# Patient Record
Sex: FEMALE | Race: BLACK OR AFRICAN AMERICAN | NOT HISPANIC OR LATINO | URBAN - METROPOLITAN AREA
[De-identification: names, ages, dates, MRNs, and addresses within clinical notes are randomized per-mention and may not be internally consistent; named-entity substitution may affect disease eponyms.]

---

## 2020-08-18 ENCOUNTER — TELEPHONE ENCOUNTER (OUTPATIENT)
Dept: URBAN - METROPOLITAN AREA CLINIC 96 | Facility: CLINIC | Age: 70
End: 2020-08-18

## 2020-08-27 ENCOUNTER — TELEPHONE ENCOUNTER (OUTPATIENT)
Dept: URBAN - METROPOLITAN AREA CLINIC 96 | Facility: CLINIC | Age: 70
End: 2020-08-27

## 2020-09-14 ENCOUNTER — LAB OUTSIDE AN ENCOUNTER (OUTPATIENT)
Dept: URBAN - METROPOLITAN AREA CLINIC 96 | Facility: CLINIC | Age: 70
End: 2020-09-14

## 2020-09-14 ENCOUNTER — OFFICE VISIT (OUTPATIENT)
Dept: URBAN - METROPOLITAN AREA MEDICAL CENTER 28 | Facility: MEDICAL CENTER | Age: 70
End: 2020-09-14
Payer: MEDICARE

## 2020-09-14 DIAGNOSIS — K22.8 OTHER SPECIFIED DISEASES OF ESOPHAGUS: ICD-10-CM

## 2020-09-14 DIAGNOSIS — Z12.11 AVERAGE RISK FOR CRC. DUE TO PT'S CO-MORBID STATE WITH END STAGE DEMENTIA, HIGH RISK FOR ANESTHESIA (PER NEUROLOGY); INABILITY TO TAKE A BOWEL PREP....WOULD NOT ADVISE ANY COLORECTAL CANCER SCREENING INCLUDING STOOL TEST FOR FECAL BLOOD.: ICD-10-CM

## 2020-09-14 DIAGNOSIS — K21.9 ACID REFLUX: ICD-10-CM

## 2020-09-14 DIAGNOSIS — D12.2 ADENOMA OF ASCENDING COLON: ICD-10-CM

## 2020-09-14 DIAGNOSIS — K29.60 ADENOPAPILLOMATOSIS GASTRICA: ICD-10-CM

## 2020-09-14 DIAGNOSIS — K31.89 ACQUIRED DEFORMITY OF DUODENUM: ICD-10-CM

## 2020-09-14 LAB
GLUCOSE POC: 98
PERFORMING LAB: (no result)

## 2020-09-14 PROCEDURE — 43239 EGD BIOPSY SINGLE/MULTIPLE: CPT | Performed by: INTERNAL MEDICINE

## 2020-09-14 PROCEDURE — 45380 COLONOSCOPY AND BIOPSY: CPT | Performed by: INTERNAL MEDICINE

## 2020-10-05 ENCOUNTER — WEB ENCOUNTER (OUTPATIENT)
Dept: URBAN - METROPOLITAN AREA CLINIC 96 | Facility: CLINIC | Age: 70
End: 2020-10-05

## 2020-10-05 ENCOUNTER — OFFICE VISIT (OUTPATIENT)
Dept: URBAN - METROPOLITAN AREA CLINIC 96 | Facility: CLINIC | Age: 70
End: 2020-10-05
Payer: MEDICARE

## 2020-10-05 ENCOUNTER — DASHBOARD ENCOUNTERS (OUTPATIENT)
Age: 70
End: 2020-10-05

## 2020-10-05 DIAGNOSIS — D12.2 ADENOMATOUS POLYP OF ASCENDING COLON: ICD-10-CM

## 2020-10-05 DIAGNOSIS — K29.50 CHRONIC GASTRITIS WITHOUT BLEEDING, UNSPECIFIED GASTRITIS TYPE: ICD-10-CM

## 2020-10-05 PROBLEM — 8493009: Status: ACTIVE | Noted: 2020-10-05

## 2020-10-05 PROCEDURE — 3017F COLORECTAL CA SCREEN DOC REV: CPT | Performed by: PHYSICIAN ASSISTANT

## 2020-10-05 PROCEDURE — G8484 FLU IMMUNIZE NO ADMIN: HCPCS | Performed by: PHYSICIAN ASSISTANT

## 2020-10-05 PROCEDURE — 99213 OFFICE O/P EST LOW 20 MIN: CPT | Performed by: PHYSICIAN ASSISTANT

## 2020-10-05 PROCEDURE — G8427 DOCREV CUR MEDS BY ELIG CLIN: HCPCS | Performed by: PHYSICIAN ASSISTANT

## 2020-10-05 PROCEDURE — G8417 CALC BMI ABV UP PARAM F/U: HCPCS | Performed by: PHYSICIAN ASSISTANT

## 2020-10-05 PROCEDURE — G9903 PT SCRN TBCO ID AS NON USER: HCPCS | Performed by: PHYSICIAN ASSISTANT

## 2020-10-05 RX ORDER — ASPIRIN 81 MG/1
TABLET, CHEWABLE ORAL
Qty: 0 | Refills: 0 | Status: ACTIVE | COMMUNITY
Start: 1900-01-01

## 2020-10-05 RX ORDER — FEBUXOSTAT 40 MG/1
TAKE 1 TABLET (80 MG) BY ORAL ROUTE ONCE DAILY TABLET ORAL 1
Refills: 0 | Status: ACTIVE | COMMUNITY
Start: 1900-01-01

## 2020-10-05 RX ORDER — ATORVASTATIN CALCIUM 40 MG/1
TAKE 1 TABLET (40 MG) BY ORAL ROUTE ONCE DAILY TABLET, FILM COATED ORAL 1
Qty: 0 | Refills: 0 | Status: ACTIVE | COMMUNITY
Start: 1900-01-01

## 2020-10-05 NOTE — HPI-TODAY'S VISIT:
Patient is here for follow-up from her upper endoscopy and colonoscopy that was performed on September 14, 2020. Her colonoscopy she had internal hemorrhoids, diverticulosis in the sigmoid and descending colon, 2 colon polyps one and the ascending colon and one of the sigmoid colon and a redundant colon. The pathology of the polyps consisted of a tubular adenoma in the ascending colon and a hyperplastic polyp in the sigmoid colon. It was recommended to have a repeat colonoscopy in three years. Her upper endoscopy showed grade a reflux esophagitis, antral gastritis with a few small erosion's. Pathology consistent with gastritis. She  does not take any medicaitons for indigestion No abd pain No nausea or emesis No fevers or chills She has 2-3 Bm qd - no BRBPR or melena

## 2020-10-05 NOTE — PHYSICAL EXAM CONSTITUTIONAL:
in no acute distress,  over weight, well nourished,  ambulating without difficulty,  normal communication ability

## 2025-06-18 ENCOUNTER — LAB OUTSIDE AN ENCOUNTER (OUTPATIENT)
Dept: URBAN - METROPOLITAN AREA CLINIC 96 | Facility: CLINIC | Age: 75
End: 2025-06-18

## 2025-06-18 ENCOUNTER — OFFICE VISIT (OUTPATIENT)
Dept: URBAN - METROPOLITAN AREA CLINIC 96 | Facility: CLINIC | Age: 75
End: 2025-06-18
Payer: COMMERCIAL

## 2025-06-18 DIAGNOSIS — Z86.0101 PERSONAL HISTORY OF ADENOMATOUS AND SERRATED COLON POLYPS: ICD-10-CM

## 2025-06-18 DIAGNOSIS — N18.31 STAGE 3A CHRONIC KIDNEY DISEASE (CKD): ICD-10-CM

## 2025-06-18 DIAGNOSIS — R03.0 ELEVATED BLOOD PRESSURE READING: ICD-10-CM

## 2025-06-18 PROBLEM — 700378005: Status: ACTIVE | Noted: 2025-06-18

## 2025-06-18 PROBLEM — 24184005: Status: ACTIVE | Noted: 2025-06-18

## 2025-06-18 PROCEDURE — 99204 OFFICE O/P NEW MOD 45 MIN: CPT

## 2025-06-18 RX ORDER — ATORVASTATIN CALCIUM 40 MG/1
TAKE 1 TABLET (40 MG) BY ORAL ROUTE ONCE DAILY TABLET, FILM COATED ORAL 1
Qty: 0 | Refills: 0 | Status: ACTIVE | COMMUNITY
Start: 1900-01-01

## 2025-06-18 RX ORDER — FEBUXOSTAT 40 MG/1
TAKE 1 TABLET (80 MG) BY ORAL ROUTE ONCE DAILY TABLET ORAL 1
Refills: 0 | Status: ACTIVE | COMMUNITY
Start: 1900-01-01

## 2025-06-18 RX ORDER — ASPIRIN 81 MG/1
TABLET, CHEWABLE ORAL
Qty: 0 | Refills: 0 | Status: ACTIVE | COMMUNITY
Start: 1900-01-01

## 2025-06-18 RX ORDER — SODIUM, POTASSIUM,MAG SULFATES 17.5-3.13G
AS DIRECTED SOLUTION, RECONSTITUTED, ORAL ORAL
Qty: 1 | Refills: 0 | OUTPATIENT
Start: 2025-06-18 | End: 2025-06-20

## 2025-06-18 NOTE — HPI-TODAY'S VISIT:
75 year old female presents for colonoscopy screening. . PCP: Dr. Alexandr GUAJARDO for annual in 2024.  Blood work - ok other than kidneys. Due for bloodwork soon. Sees Dr. Sheridan Madrigal, Nephrologist -- per patient, provider tells her she does not have kidney disease. Review of outside records indicates CKD 3a. . BMs - At least once a day - like clockwork. Denies overt blood, mucus, and melena. Decreased appetite recently -- 6 months.  May have lost a few pounds. Denies dysphagia, odynophagia, nausea, vomiting, abdominal pain, post-prandial pain. Denies heartburn, reflux Denies frequent use of NSAIDs. Reports some SOB.

## 2025-06-18 NOTE — HPI-OTHER HISTORIES
Previously 10/2020 with Dr. Barker: Patient is here for follow-up from her upper endoscopy and colonoscopy that was performed on September 14, 2020. Her colonoscopy she had internal hemorrhoids, diverticulosis in the sigmoid and descending colon, 2 colon polyps one and the ascending colon and one of the sigmoid colon and a redundant colon. The pathology of the polyps consisted of a tubular adenoma in the ascending colon and a hyperplastic polyp in the sigmoid colon. It was recommended to have a repeat colonoscopy in three years. Her upper endoscopy showed grade a reflux esophagitis, antral gastritis with a few small erosion's. Pathology consistent with gastritis. She does not take any medicaitons for indigestion No abd pain No nausea or emesis No fevers or chills She has 2-3 Bm qd - no BRBPR or melena

## 2025-07-03 ENCOUNTER — LAB OUTSIDE AN ENCOUNTER (OUTPATIENT)
Dept: URBAN - METROPOLITAN AREA CLINIC 92 | Facility: CLINIC | Age: 75
End: 2025-07-03

## 2025-07-03 ENCOUNTER — OFFICE VISIT (OUTPATIENT)
Dept: URBAN - METROPOLITAN AREA MEDICAL CENTER 28 | Facility: MEDICAL CENTER | Age: 75
End: 2025-07-03
Payer: COMMERCIAL

## 2025-07-03 DIAGNOSIS — Z86.0101 PERSONAL HISTORY OF ADENOMATOUS AND SERRATED COLON POLYPS: ICD-10-CM

## 2025-07-03 DIAGNOSIS — Z09 ENCOUNTER FOR FOLLOW-UP EXAMINATION AFTER COMPLETED TREATMENT FOR CONDITIONS OTHER THAN MALIGNANT NEOPLASM: ICD-10-CM

## 2025-07-03 LAB
GLUCOSE POC: 88
PERFORMING LAB: (no result)

## 2025-07-03 PROCEDURE — G0105 COLORECTAL SCRN; HI RISK IND: HCPCS | Performed by: INTERNAL MEDICINE

## 2025-07-03 RX ORDER — ATORVASTATIN CALCIUM 40 MG/1
TAKE 1 TABLET (40 MG) BY ORAL ROUTE ONCE DAILY TABLET, FILM COATED ORAL 1
Qty: 0 | Refills: 0 | Status: ACTIVE | COMMUNITY
Start: 1900-01-01

## 2025-07-03 RX ORDER — FEBUXOSTAT 40 MG/1
TAKE 1 TABLET (80 MG) BY ORAL ROUTE ONCE DAILY TABLET ORAL 1
Refills: 0 | Status: ACTIVE | COMMUNITY
Start: 1900-01-01

## 2025-07-03 RX ORDER — ASPIRIN 81 MG/1
TABLET, CHEWABLE ORAL
Qty: 0 | Refills: 0 | Status: ACTIVE | COMMUNITY
Start: 1900-01-01